# Patient Record
Sex: MALE | Race: BLACK OR AFRICAN AMERICAN | NOT HISPANIC OR LATINO | Employment: OTHER | URBAN - METROPOLITAN AREA
[De-identification: names, ages, dates, MRNs, and addresses within clinical notes are randomized per-mention and may not be internally consistent; named-entity substitution may affect disease eponyms.]

---

## 2020-09-09 ENCOUNTER — HOSPITAL ENCOUNTER (EMERGENCY)
Facility: HOSPITAL | Age: 30
Discharge: HOME | End: 2020-09-09
Attending: EMERGENCY MEDICINE

## 2020-09-09 VITALS
TEMPERATURE: 99 F | OXYGEN SATURATION: 100 % | WEIGHT: 120 LBS | BODY MASS INDEX: 18.19 KG/M2 | SYSTOLIC BLOOD PRESSURE: 125 MMHG | RESPIRATION RATE: 18 BRPM | HEART RATE: 95 BPM | DIASTOLIC BLOOD PRESSURE: 75 MMHG | HEIGHT: 68 IN

## 2020-09-09 DIAGNOSIS — K61.0 PERIANAL ABSCESS: Primary | ICD-10-CM

## 2020-09-09 PROCEDURE — 63600175 PHARM REV CODE 636 W HCPCS: Mod: SL,ER | Performed by: EMERGENCY MEDICINE

## 2020-09-09 PROCEDURE — 90471 IMMUNIZATION ADMIN: CPT | Mod: VFC,ER | Performed by: EMERGENCY MEDICINE

## 2020-09-09 PROCEDURE — 46050 I&D PERIANAL ABSCESS SUPFC: CPT | Mod: ER

## 2020-09-09 PROCEDURE — 99284 EMERGENCY DEPT VISIT MOD MDM: CPT | Mod: 25,ER

## 2020-09-09 PROCEDURE — 25000003 PHARM REV CODE 250: Mod: ER | Performed by: EMERGENCY MEDICINE

## 2020-09-09 PROCEDURE — 90715 TDAP VACCINE 7 YRS/> IM: CPT | Mod: SL,ER | Performed by: EMERGENCY MEDICINE

## 2020-09-09 RX ORDER — LIDOCAINE HYDROCHLORIDE AND EPINEPHRINE 10; 10 MG/ML; UG/ML
10 INJECTION, SOLUTION INFILTRATION; PERINEURAL
Status: COMPLETED | OUTPATIENT
Start: 2020-09-09 | End: 2020-09-09

## 2020-09-09 RX ORDER — KETOROLAC TROMETHAMINE 10 MG/1
10 TABLET, FILM COATED ORAL EVERY 6 HOURS PRN
Qty: 12 TABLET | Refills: 0 | Status: SHIPPED | OUTPATIENT
Start: 2020-09-09 | End: 2020-09-12

## 2020-09-09 RX ORDER — MUPIROCIN 20 MG/G
OINTMENT TOPICAL 2 TIMES DAILY
Qty: 22 G | Refills: 0 | Status: SHIPPED | OUTPATIENT
Start: 2020-09-09 | End: 2020-09-19

## 2020-09-09 RX ORDER — TRAMADOL HYDROCHLORIDE 50 MG/1
50 TABLET ORAL EVERY 6 HOURS PRN
Qty: 12 TABLET | Refills: 0 | Status: SHIPPED | OUTPATIENT
Start: 2020-09-09

## 2020-09-09 RX ORDER — SULFAMETHOXAZOLE AND TRIMETHOPRIM 800; 160 MG/1; MG/1
1 TABLET ORAL 2 TIMES DAILY
Qty: 20 TABLET | Refills: 0 | Status: SHIPPED | OUTPATIENT
Start: 2020-09-09 | End: 2020-09-19

## 2020-09-09 RX ADMIN — CLOSTRIDIUM TETANI TOXOID ANTIGEN (FORMALDEHYDE INACTIVATED), CORYNEBACTERIUM DIPHTHERIAE TOXOID ANTIGEN (FORMALDEHYDE INACTIVATED), BORDETELLA PERTUSSIS TOXOID ANTIGEN (GLUTARALDEHYDE INACTIVATED), BORDETELLA PERTUSSIS FILAMENTOUS HEMAGGLUTININ ANTIGEN (FORMALDEHYDE INACTIVATED), BORDETELLA PERTUSSIS PERTACTIN ANTIGEN, AND BORDETELLA PERTUSSIS FIMBRIAE 2/3 ANTIGEN 0.5 ML: 5; 2; 2.5; 5; 3; 5 INJECTION, SUSPENSION INTRAMUSCULAR at 06:09

## 2020-09-09 RX ADMIN — LIDOCAINE HYDROCHLORIDE AND EPINEPHRINE 10 ML: 10; 10 INJECTION, SOLUTION INFILTRATION; PERINEURAL at 06:09

## 2020-09-09 NOTE — Clinical Note
Sukhi Ingram was seen and treated in our emergency department on 9/9/2020.  He may return to work on 09/12/2020.       If you have any questions or concerns, please don't hesitate to call.       RN

## 2020-09-09 NOTE — ED TRIAGE NOTES
Pt to er c/o abscess to R inner buttock near rectum--redness and swelling noted--denies any drainage and fever--tender to palp

## 2020-09-09 NOTE — ED PROVIDER NOTES
"Encounter Date: 9/9/2020    SCRIBE #1 NOTE: I, Tammy Palm , am scribing for, and in the presence of,  Dr. Wilkinson . I have scribed the following portions of the note - Other sections scribed: HPI, ROS, PE .       History     Chief Complaint   Patient presents with    Abscess     Pt states," I have a sore on my backside for four days."     Sukhi Ingram is a 29 y.o. male who presents to the ED complaining of  Acute painful abscess to his anal region for 4 days. Patent endorses pain with sitting, coughing, and walking. He denies fever or drainage from the area. The area feels swollen He has never had an abscess in that area before. He is unsure of the date of his last tetanus. He denies history of Chron's Disease or blood in stool.     The history is provided by the patient. No  was used.     Review of patient's allergies indicates:  No Known Allergies  History reviewed. No pertinent past medical history.  History reviewed. No pertinent surgical history.  History reviewed. No pertinent family history.  Social History     Tobacco Use    Smoking status: Current Every Day Smoker    Smokeless tobacco: Never Used   Substance Use Topics    Alcohol use: Not on file    Drug use: Not on file     Review of Systems   Constitutional: Negative for fever.   Gastrointestinal: Negative for abdominal pain, blood in stool, constipation, diarrhea and rectal pain.   Genitourinary: Negative for difficulty urinating, dysuria, hematuria, penile pain and penile swelling.   Skin: Positive for wound.   All other systems reviewed and are negative.      Physical Exam     Initial Vitals [09/09/20 1723]   BP Pulse Resp Temp SpO2   129/81 (!) 113 20 98.8 °F (37.1 °C) 99 %      MAP       --         Physical Exam    Nursing note and vitals reviewed.  Constitutional: He appears well-developed and well-nourished.   HENT:   Head: Normocephalic and atraumatic.   Nose: Nose normal.   Eyes: Conjunctivae are normal.   Neck: " "Normal range of motion and phonation normal. Neck supple. No stridor present.   Cardiovascular: Normal rate and intact distal pulses.   Pulmonary/Chest: Effort normal. No stridor. No respiratory distress.   Abdominal: Normal appearance.   Musculoskeletal: Normal range of motion. No tenderness or edema.   Neurological: He is alert and oriented to person, place, and time. Gait normal.   Skin: Skin is warm and dry. Abscess noted.   Abscess on the left buttock around the anus. There is fluctuance and erythema. 4x4 cm adjacent to the rectum at the 3 o'clock position.    Psychiatric: He has a normal mood and affect. His behavior is normal.         ED Course   I & D - Incision and Drainage    Date/Time: 2020 5:53 PM  Location procedure was performed: [facility] EMERGENCY DEPARTMENT  Performed by: Hank Wilkinson MD  Authorized by: Hank Wilkinson MD   Consent Done: Yes  Consent: Verbal consent obtained.  Consent given by: patient  Patient understanding: patient states understanding of the procedure being performed  Patient identity confirmed: , name and verbally with patient  Time out: Immediately prior to procedure a "time out" was called to verify the correct patient, procedure, equipment, support staff and site/side marked as required.  Type: abscess  Body area: anogenital  Location details: perianal  Anesthesia: local infiltration    Anesthesia:  Local Anesthetic: lidocaine 1% with epinephrine  Anesthetic total: 6 mL  Patient sedated: no  Scalpel size: 11  Incision type: single straight  Complexity: simple  Drainage: purulent,  bloody and  pus  Drainage amount: copious  Wound treatment: wound packed,  incision,  wound left open,  drainage and  expression of material  Packing material: 1/ in iodoform gauze  Complications: No  Estimated blood loss (mL): 10  Patient tolerance: Patient tolerated the procedure well with no immediate complications        Labs Reviewed - No data to display       Imaging Results    None        "   Medical Decision Making:   History:   Old Medical Records: I decided to obtain old medical records.    Labs Reviewed  No visits with results within 1 Day(s) from this visit.   Latest known visit with results is:   No results found for any previous visit.        Imaging Reviewed    Imaging Results    None         Medications given in ED    Medications   lidocaine-EPINEPHrine 1%-1:100,000 injection 10 mL (10 mLs Intradermal Given 9/9/20 1812)   Tdap vaccine injection 0.5 mL (0.5 mLs Intramuscular Given 9/9/20 1855)       Scribe Attestation:   Scribe #1: I performed the above scribed service and the documentation accurately describes the services I performed. I attest to the accuracy of the note.    This document was produced by a scribe under my direction and in my presence. I agree with the content of the note and have made any necessary edits.     Hank Wilkinson MD         Note was created using voice recognition software. Note may have occasional typographical errors that may not have been identified and edited despite good vicente initial review prior to signing.            Scribe Attestation:   Scribe #1: I performed the above scribed service and the documentation accurately describes the services I performed. I attest to the accuracy of the note.    Attending Attestation:           Physician Attestation for Scribe:  Physician Attestation Statement for Scribe #1: I, Hank Wilkinson, reviewed documentation, as scribed by Tammy Palm in my presence, and it is both accurate and complete.                           Clinical Impression:     1. Perianal abscess                ED Disposition Condition    Discharge Stable        ED Prescriptions     Medication Sig Dispense Start Date End Date Auth. Provider    sulfamethoxazole-trimethoprim 800-160mg (BACTRIM DS) 800-160 mg Tab Take 1 tablet by mouth 2 (two) times daily. for 10 days 20 tablet 9/9/2020 9/19/2020 Hank Wilkinson MD    mupirocin (BACTROBAN) 2 % ointment Apply  topically 2 (two) times daily. for 10 days 22 g 9/9/2020 9/19/2020 Hank Wilkinson MD    ketorolac (TORADOL) 10 mg tablet Take 1 tablet (10 mg total) by mouth every 6 (six) hours as needed for Pain (take with food). 12 tablet 9/9/2020 9/12/2020 Hank Wilkinson MD    traMADoL (ULTRAM) 50 mg tablet Take 1 tablet (50 mg total) by mouth every 6 (six) hours as needed. 12 tablet 9/9/2020  Hank Wilkinson MD        Follow-up Information     Follow up With Specialties Details Why Contact Info    [facility] Emergency Department Emergency Medicine Go in 2 days For wound re-check and packing removal [address] [facility] [id number] [phone]    [facility] GENERAL SURGERY General Surgery Call in 1 day to schedule an appointment, for re-evaluation of today's complaint [address]  [facility] [id number] [phone]                                       Hank Wilkinson MD  09/09/20 1328